# Patient Record
Sex: MALE | Race: WHITE | NOT HISPANIC OR LATINO | Employment: FULL TIME | URBAN - METROPOLITAN AREA
[De-identification: names, ages, dates, MRNs, and addresses within clinical notes are randomized per-mention and may not be internally consistent; named-entity substitution may affect disease eponyms.]

---

## 2017-01-09 ENCOUNTER — OFFICE VISIT (OUTPATIENT)
Dept: SPORTS MEDICINE | Facility: CLINIC | Age: 52
End: 2017-01-09

## 2017-01-09 VITALS
SYSTOLIC BLOOD PRESSURE: 109 MMHG | WEIGHT: 214 LBS | HEIGHT: 72 IN | OXYGEN SATURATION: 98 % | HEART RATE: 50 BPM | DIASTOLIC BLOOD PRESSURE: 73 MMHG | BODY MASS INDEX: 28.99 KG/M2 | TEMPERATURE: 97.7 F

## 2017-01-09 DIAGNOSIS — M99.02 THORACIC REGION SOMATIC DYSFUNCTION: ICD-10-CM

## 2017-01-09 DIAGNOSIS — M99.01 CERVICAL SOMATIC DYSFUNCTION: ICD-10-CM

## 2017-01-09 DIAGNOSIS — M54.2 ACUTE NECK PAIN: Primary | ICD-10-CM

## 2017-01-09 PROCEDURE — 98925 OSTEOPATH MANJ 1-2 REGIONS: CPT | Performed by: FAMILY MEDICINE

## 2017-01-09 PROCEDURE — 99204 OFFICE O/P NEW MOD 45 MIN: CPT | Performed by: FAMILY MEDICINE

## 2017-01-09 RX ORDER — TIZANIDINE HYDROCHLORIDE 4 MG/1
4 CAPSULE, GELATIN COATED ORAL 3 TIMES DAILY PRN
Qty: 30 CAPSULE | Refills: 0 | Status: SHIPPED | OUTPATIENT
Start: 2017-01-09 | End: 2021-09-02

## 2017-01-09 NOTE — PROGRESS NOTES
"Virgilio is a 51 y.o. year old male    Chief Complaint   Patient presents with   • Cervical Spine - Pain       History of Present Illness  Neck pain: x 6 wks. No specific injury. Predominately right sided. Worsened overnight. Also has difficulty getting in and out of car. Tried heating pad which helped. Advil gives some relief.  Requests OMT.    I have reviewed the patient's medical history in detail and updated the computerized patient record.    Review of Systems   Constitutional: Negative for chills and fever.   Respiratory: Negative for chest tightness, shortness of breath and wheezing.    Cardiovascular: Negative for chest pain.   Musculoskeletal: Positive for neck pain. Negative for back pain, gait problem and neck stiffness.   Neurological: Negative for weakness, numbness and headaches.   All other systems reviewed and are negative.      Visit Vitals   • /73 (BP Location: Right arm, Patient Position: Sitting, Cuff Size: Adult)   • Pulse 50   • Temp 97.7 °F (36.5 °C) (Oral)   • Ht 72\" (182.9 cm)   • Wt 214 lb (97.1 kg)   • SpO2 98%   • BMI 29.02 kg/m2        Physical Exam    Vital signs reviewed.   General: No acute distress.  Eyes: conjunctiva clear; pupils equally round and reactive  ENT: external ears and nose atraumatic; oropharynx clear  CV: no peripheral edema, 2+ distal pulses  Resp: normal respiratory effort, no use of accessory muscles  Skin: no rashes or wounds; normal turgor  Psych: mood and affect appropriate; recent and remote memory intact  Neuro: sensation to light touch intact; 2+ DTRs C5, C6 bilateral    MSK Exam:  C-spine: Limited range of motion with right rotation, right side bending due to pain; negative Spurling maneuver bilateral; 5/5  strength, arm abduction    Osteopathic exam:  Tissue texture changes cervical, thoracic spine  Restricted OA diaphragm  Restricted thoracic outlet  Right trapezius counterstrain tender point  Right rhomboid counterstrain tender points  T3-4 neutral, " side bent left rotated right    Procedure: osteopathic manipulative treatment    Risks, benefits and alternatives discussed with patient and verbal consent obtained. Area of maximal tenderness palpated and documented above. Treatment included: counterstrain, HVLA and myofascial release. Patient tolerated treatment well and demonstrated decreased tenderness, improved range of motion. Post treatment instructions given verbally.      Diagnoses and all orders for this visit:    Acute neck pain  -     TiZANidine (ZANAFLEX) 4 MG capsule; Take 1 capsule by mouth 3 (Three) Times a Day As Needed for muscle spasms.    Cervical somatic dysfunction    Thoracic region somatic dysfunction      Tolerated osteopathic manipulative treatment well as documented above.  Postprocedural instructions given.  Also recommend that he start home exercise program for range of motion and strengthening of the neck.  Can try tizanidine on as-needed basis.  I'll follow-up in one week.

## 2017-01-09 NOTE — MR AVS SNAPSHOT
Virgilio Sheets   1/9/2017 11:40 AM   Office Visit    Dept Phone:  [phone]   Encounter #:  [id number]    Provider:  Jeremy Dobbs Jr., DO   Department:  [facility]                Your Full Care Plan              Today's Medication Changes          These changes are accurate as of: 1/9/17 12:14 PM.  If you have any questions, ask your nurse or doctor.               New Medication(s)Ordered:     TiZANidine 4 MG capsule   Commonly known as:  ZANAFLEX   Take 1 capsule by mouth 3 (Three) Times a Day As Needed for muscle spasms.   Started by:  Jeremy Dobbs Jr., DO            Where to Get Your Medications      These medications were sent to [address] [phone] [phone] [address] [address]     Phone:  [phone]     TiZANidine 4 MG capsule                  Your Updated Medication List          This list is accurate as of: 1/9/17 12:14 PM.  Always use your most recent med list.                allopurinol 300 MG tablet   Commonly known as:  ZYLOPRIM   Take 1 tablet by mouth daily.       amLODIPine-benazepril 10-40 MG per capsule   Commonly known as:  LOTREL       azithromycin 250 MG tablet   Commonly known as:  ZITHROMAX Z-ROSALBA   Take 2 tablets the first day, then 1 tablet daily for 4 days.       Calcitriol 3 MCG/GM cream   Apply 1 application topically every night.       ciclopirox 8 % solution   Commonly known as:  PENLAC   Apply  topically every night.       ciprofloxacin 500 MG tablet   Commonly known as:  CIPRO   Take 1 tablet by mouth 2 (two) times a day.       clobetasol 0.05 % ointment   Commonly known as:  TEMOVATE   Apply  topically 2 (two) times a day.       guaifenesin-codeine 100-10 MG/5ML liquid   Commonly known as:  GUAIFENESIN AC   1-2 teaspoons at bedtime as needed for cough       hydrochlorothiazide 25 MG tablet    "  Commonly known as:  HYDRODIURIL   Take 1 tablet by mouth daily.       levothyroxine 125 MCG tablet   Commonly known as:  SYNTHROID, LEVOTHROID   Take 1 tablet by mouth daily.       rosuvastatin 20 MG tablet   Commonly known as:  CRESTOR       TiZANidine 4 MG capsule   Commonly known as:  ZANAFLEX   Take 1 capsule by mouth 3 (Three) Times a Day As Needed for muscle spasms.       Vitamin D 2000 UNITS tablet   Take 2,000 Units by mouth every other day.               You Were Diagnosed With        Codes Comments    Acute neck pain    -  Primary ICD-10-CM: M54.2  ICD-9-CM: 723.1     Cervical somatic dysfunction     ICD-10-CM: M99.01  ICD-9-CM: 739.1     Thoracic region somatic dysfunction     ICD-10-CM: M99.02  ICD-9-CM: 739.2       Instructions     None    Patient Instructions History      Upcoming Appointments     Visit Type Date Time Department    NEW PT - SPORTS MEDICINE 1/9/2017 11:40 AM [facility] SPORTS MED [facility]      [employer] Signup     Our records indicate that you have an active [employer] account.    You can view your After Visit Summary by going to [employer] and logging in with your [employer] username and password.  If you don't have a [employer] username and password but a parent or guardian has access to your record, the parent or guardian should login with their own [employer] username and password and access your record to view the After Visit Summary.    If you have questions, you can email [email or web address] or call [phone] to talk to our [employer] staff.  Remember, [employer] is NOT to be used for urgent needs.  For medical emergencies, dial 911.               Other Info from Your Visit           Allergies     No Known Allergies      Reason for Visit     Cervical Spine - Pain           Vital Signs     Blood Pressure Pulse Temperature Height Weight Oxygen Saturation    109/73 (BP Location: Right arm, Patient Position: Sitting, Cuff Size: Adult) 50 97.7 °F (36.5 °C) (Oral) 72\" " (182.9 cm) 214 lb (97.1 kg) 98%    Body Mass Index Smoking Status                29.02 kg/m2 Never Smoker          Problems and Diagnoses Noted     Neck pain    -  Primary    Cervical somatic dysfunction        Thoracic region somatic dysfunction

## 2017-05-22 ENCOUNTER — TELEPHONE (OUTPATIENT)
Dept: ORTHOPEDIC SURGERY | Facility: CLINIC | Age: 52
End: 2017-05-22

## 2017-05-26 ENCOUNTER — OFFICE VISIT (OUTPATIENT)
Dept: ORTHOPEDIC SURGERY | Facility: CLINIC | Age: 52
End: 2017-05-26

## 2017-05-26 VITALS — TEMPERATURE: 98 F | WEIGHT: 214 LBS | HEIGHT: 72 IN | BODY MASS INDEX: 28.99 KG/M2

## 2017-05-26 DIAGNOSIS — IMO0002 BURSITIS/TENDONITIS, SHOULDER: ICD-10-CM

## 2017-05-26 DIAGNOSIS — M25.512 ACUTE PAIN OF LEFT SHOULDER: Primary | ICD-10-CM

## 2017-05-26 DIAGNOSIS — M75.02 ADHESIVE CAPSULITIS OF LEFT SHOULDER: ICD-10-CM

## 2017-05-26 PROCEDURE — 73030 X-RAY EXAM OF SHOULDER: CPT | Performed by: ORTHOPAEDIC SURGERY

## 2017-05-26 PROCEDURE — 20610 DRAIN/INJ JOINT/BURSA W/O US: CPT | Performed by: ORTHOPAEDIC SURGERY

## 2017-05-26 PROCEDURE — 99203 OFFICE O/P NEW LOW 30 MIN: CPT | Performed by: ORTHOPAEDIC SURGERY

## 2017-05-26 RX ORDER — BUPIVACAINE HYDROCHLORIDE 2.5 MG/ML
4 INJECTION, SOLUTION INFILTRATION; PERINEURAL
Status: COMPLETED | OUTPATIENT
Start: 2017-05-26 | End: 2017-05-26

## 2017-05-26 RX ORDER — MELOXICAM 15 MG/1
TABLET ORAL
Qty: 30 TABLET | Refills: 3 | Status: SHIPPED | OUTPATIENT
Start: 2017-05-26 | End: 2021-09-02

## 2017-05-26 RX ORDER — METHYLPREDNISOLONE ACETATE 80 MG/ML
80 INJECTION, SUSPENSION INTRA-ARTICULAR; INTRALESIONAL; INTRAMUSCULAR; SOFT TISSUE
Status: COMPLETED | OUTPATIENT
Start: 2017-05-26 | End: 2017-05-26

## 2017-05-26 RX ADMIN — BUPIVACAINE HYDROCHLORIDE 4 ML: 2.5 INJECTION, SOLUTION INFILTRATION; PERINEURAL at 09:06

## 2017-05-26 RX ADMIN — METHYLPREDNISOLONE ACETATE 80 MG: 80 INJECTION, SUSPENSION INTRA-ARTICULAR; INTRALESIONAL; INTRAMUSCULAR; SOFT TISSUE at 09:06

## 2019-02-22 DIAGNOSIS — M54.50 LUMBAR SPINE PAIN: Primary | ICD-10-CM

## 2019-03-26 ENCOUNTER — CONSULT (OUTPATIENT)
Dept: ORTHOPEDIC SURGERY | Facility: CLINIC | Age: 54
End: 2019-03-26

## 2019-03-26 VITALS — BODY MASS INDEX: 28.44 KG/M2 | WEIGHT: 210 LBS | HEIGHT: 72 IN | TEMPERATURE: 98 F

## 2019-03-26 DIAGNOSIS — M54.31 BILATERAL SCIATICA: ICD-10-CM

## 2019-03-26 DIAGNOSIS — M54.50 SPINE PAIN, LUMBAR: Primary | ICD-10-CM

## 2019-03-26 DIAGNOSIS — M54.32 BILATERAL SCIATICA: ICD-10-CM

## 2019-03-26 PROCEDURE — 72100 X-RAY EXAM L-S SPINE 2/3 VWS: CPT | Performed by: ORTHOPAEDIC SURGERY

## 2019-03-26 PROCEDURE — 99214 OFFICE O/P EST MOD 30 MIN: CPT | Performed by: ORTHOPAEDIC SURGERY

## 2019-03-26 RX ORDER — METHYLPREDNISOLONE 4 MG/1
TABLET ORAL
Qty: 21 TABLET | Refills: 0 | Status: SHIPPED | OUTPATIENT
Start: 2019-03-26 | End: 2019-05-03

## 2019-03-26 NOTE — PROGRESS NOTES
New patient or new problem visit    Chief Complaint   Patient presents with   • Lumbar Spine - Pain       HPI: He complains of right forefoot numbness in the third intermetatarsal carpal space since August of last year.  Is more numbness than pain injection in the area by his podiatrist did not help he also notes back pain which radiates to the left buttock and left lower extremity.  The leg pain exceeds the back pain.  He states he would not be here if it were not for the leg pain.  Physical therapy has not helped.    PFSH: See chart- reviewed    Review of Systems   Constitutional: Negative for chills, fever and unexpected weight change.   HENT: Negative for trouble swallowing and voice change.    Eyes: Negative for visual disturbance.   Respiratory: Negative for cough and shortness of breath.    Cardiovascular: Negative for chest pain and leg swelling.   Gastrointestinal: Negative for abdominal pain, nausea and vomiting.   Endocrine: Negative for cold intolerance and heat intolerance.   Genitourinary: Negative for difficulty urinating, dysuria, frequency and urgency.   Musculoskeletal: Positive for back pain.   Skin: Negative for rash and wound.   Allergic/Immunologic: Negative for immunocompromised state.   Neurological: Positive for numbness. Negative for weakness and headaches.   Hematological: Does not bruise/bleed easily.   Psychiatric/Behavioral: Negative for dysphoric mood. The patient is not nervous/anxious.        PE: Constitutional: Vital signs above-noted.  Awake, alert and oriented    Psychiatric: Affect and insight do not appear grossly disturbed.    Pulmonary: Breathing is unlabored, color is good.    Skin: Warm, dry and normal turgor    Cardiac: Pedal pulses intact.  No edema.    Eyesight and hearing appear adequate for examination purposes      Musculoskeletal:  There is mild tenderness to percussion and palpation of the spine. Motion appears undisturbed.  Posture is unremarkable to coronal and  sagittal inspection.    The skin about the area is intact.  There is no palpable or visible deformity.  There is no local spasm.       Neurologic:   Reflexes are 2+ and symmetrical in the patellae and achilles.   Motor function is undisturbed in quadriceps, EHL, and gastrocnemius   sensation appears symmetrically intact to light touch.  In the bilateral lower extremities there is no evidence of atrophy.   Clonus is absent..  Gait appears undisturbed.  SLR test negative    Palpation of the right foot in particular the third ray shows no evidence of palpable masses or tenderness    MEDICAL DECISION MAKING    XRAY: Plain film x-ray of the lumbar spine shows mild degenerative change and some loss of lordosis.  MRI report from 2015 suggested lumbosacral disc degeneration but no neurologic compression.    Other: n/a    Impression: Lumbar spinal stenosis, possibly separate issue by way of Live's neuroma versus radicular numbness    Plan: For now Medrol Dosepak.  If he is no better he will call and we will schedule MRI scan perhaps with an eye toward epidural injection.  I think he can go ahead and play golf in either event  Answers for HPI/ROS submitted by the patient on 3/21/2019   Back pain  Chronicity: recurrent  Onset: more than 1 month ago  Frequency: constantly  Progression since onset: waxing and waning  Pain location: sacro-iliac  Pain quality: aching, shooting, stabbing  Radiates to: left thigh, right foot  Pain - numeric: 6/10  Pain is: worse during the day  Aggravated by: bending, position, sitting, stress, twisting  Stiffness is present: all day  bladder incontinence: No  bowel incontinence: No  leg pain: Yes  paresis: No  paresthesias: Yes  pelvic pain: No  perianal numbness: No  tingling: Yes  weight loss: No  Risk factors: poor posture

## 2019-03-29 ENCOUNTER — TELEPHONE (OUTPATIENT)
Dept: ORTHOPEDIC SURGERY | Facility: CLINIC | Age: 54
End: 2019-03-29

## 2019-03-29 NOTE — TELEPHONE ENCOUNTER
Regarding: Non-Urgent Medical Question  Contact: [phone]  ----- Message from Mychart, Generic sent at 3/29MY CHART MESSAGE:    Good morning, Dr. Portillo.    I am in day 4 of the dose pack and have some relief.  Not complete yet on the left side.  Also, my right foot still feels the same.    I went ahead and scheduled a follow up visit with you for May 3 since your schedule is so full.  If I get complete relief before then, I will cancel it.  If not, I'd like to get the injection we discussed on that day.    Question: does it make sense to go ahead and get the MRI done now so that we know what we are dealing with?  Or do you want to give it another 2-3 weeks or so?  I just want to make sure that if we have to get an MRI, it is done in plenty of time for the May 3 follow-up.     Thanks,    Virgilio Sheets  [phone] (cell)

## 2019-03-29 NOTE — TELEPHONE ENCOUNTER
Explained to him about the epidurals-it would be unlikely we would be able to get him in on days notice.  I suggest he call back next week and if he is doing better we will hold off and if he is doing worse we will go ahead and schedule the epidural injection for early May and will get the MRI meantime.

## 2019-03-29 NOTE — TELEPHONE ENCOUNTER
Call returned to the patient.  I was unable to reach him but I did leave a message on his answering machine to go ahead and complete the Dosepak and to contact the office next week and let TOMASA DAWSON know how he is doing.  If his symptoms have improved we can hold off on the MRI and epidural however if he continues to have problems we can go ahead and schedule his MRI and put an epidural at that time per TOMASA DAWSON

## 2019-04-01 ENCOUNTER — TELEPHONE (OUTPATIENT)
Dept: ORTHOPEDIC SURGERY | Facility: CLINIC | Age: 54
End: 2019-04-01

## 2019-04-01 DIAGNOSIS — M54.32 BILATERAL SCIATICA: ICD-10-CM

## 2019-04-01 DIAGNOSIS — M54.31 BILATERAL SCIATICA: ICD-10-CM

## 2019-04-01 DIAGNOSIS — M54.50 SPINE PAIN, LUMBAR: Primary | ICD-10-CM

## 2019-04-01 NOTE — TELEPHONE ENCOUNTER
Regarding: Non-Urgent Medical Question  Contact: [phone]  ----- Message from Mychart, Generic sent at 4/1/2019  3:56 PM EDT -----  MY CHART MESSAGE:  Kip Portillo,    I completed my dose pack yesterday.  My back feels about the same, although the pain is not shooting down my left leg as much.  It is more concentrated in my left lower back region.  The numbness in my right foot feels the same.    I'd like to get that MRI scheduled.  Can you send it over to [facility] [place] [facility]?    Thanks,    Virgilio Sheets  [phone]

## 2019-04-02 ENCOUNTER — TELEPHONE (OUTPATIENT)
Dept: ORTHOPEDIC SURGERY | Facility: CLINIC | Age: 54
End: 2019-04-02

## 2019-04-03 ENCOUNTER — TELEPHONE (OUTPATIENT)
Dept: ORTHOPEDIC SURGERY | Facility: CLINIC | Age: 54
End: 2019-04-03

## 2019-04-04 DIAGNOSIS — M54.50 SPINE PAIN, LUMBAR: ICD-10-CM

## 2019-04-04 DIAGNOSIS — M54.31 BILATERAL SCIATICA: ICD-10-CM

## 2019-04-04 DIAGNOSIS — M54.32 BILATERAL SCIATICA: ICD-10-CM

## 2019-04-05 DIAGNOSIS — M54.32 BILATERAL SCIATICA: Primary | ICD-10-CM

## 2019-04-05 DIAGNOSIS — M54.50 SPINE PAIN, LUMBAR: ICD-10-CM

## 2019-04-05 DIAGNOSIS — M54.31 BILATERAL SCIATICA: Primary | ICD-10-CM

## 2019-04-08 ENCOUNTER — TELEPHONE (OUTPATIENT)
Dept: ORTHOPEDIC SURGERY | Facility: CLINIC | Age: 54
End: 2019-04-08

## 2019-04-08 DIAGNOSIS — M54.50 LUMBAR SPINE PAIN: Primary | ICD-10-CM

## 2019-04-08 NOTE — TELEPHONE ENCOUNTER
Regarding: Non-Urgent Medical Question  Contact: [phone]  ----- Message from Mychart, Generic sent at 4/8/2019  8:30 AM EDT -----  MY CHART MESSAGE:    Good morning, again.  A good friend of mine has a brother that is a very good interventional pain specialist in [place] -- Dr. Benjamin Young.  Please see his attached bio.   I'd like to use his services for the epidural, and we are in contact with each other.  Would you please fax him the order and all notes that would support the prior authorization process?  I greatly appreciate your prompt attention to this.  You can call me if you have any questions.  Dr. Young' information is below:    Fax: [phone]  Phone: [phone]    Thank you,    Virgilio Sheets

## 2019-04-09 RX ORDER — HYDROCODONE BITARTRATE AND ACETAMINOPHEN 5; 325 MG/1; MG/1
1 TABLET ORAL EVERY 8 HOURS PRN
Qty: 30 TABLET | Refills: 0 | Status: SHIPPED | OUTPATIENT
Start: 2019-04-09 | End: 2021-09-02

## 2019-04-09 NOTE — TELEPHONE ENCOUNTER
Regarding: Prescription Question  Contact: [phone]  ----- Message from Mychart, Generic sent at 4/9/2019  8:46 AM EDT -----  MY CHART MESSAGE:    Good morning, I have had 2 sleepless nights due to shooting pain in my leg.  Can you prescribe something for the nights?  I am currently managing my pain with ibuprofen (3 or 4 tablets three or four times a day).  I also manage the pain during the day by not sitting down too much, but when I sit or lie down, the pain shoots down my leg.  It has been tough to sleep.    Thanks,    Virgilio

## 2019-04-17 ENCOUNTER — TELEPHONE (OUTPATIENT)
Dept: ORTHOPEDIC SURGERY | Facility: CLINIC | Age: 54
End: 2019-04-17

## 2019-05-03 ENCOUNTER — OFFICE VISIT (OUTPATIENT)
Dept: ORTHOPEDIC SURGERY | Facility: CLINIC | Age: 54
End: 2019-05-03

## 2019-05-03 VITALS — TEMPERATURE: 97.5 F | BODY MASS INDEX: 28.31 KG/M2 | WEIGHT: 209 LBS | HEIGHT: 72 IN

## 2019-05-03 DIAGNOSIS — M54.32 SCIATICA OF LEFT SIDE: Primary | ICD-10-CM

## 2019-05-03 PROCEDURE — 99213 OFFICE O/P EST LOW 20 MIN: CPT | Performed by: ORTHOPAEDIC SURGERY

## 2019-05-03 NOTE — PROGRESS NOTES
"He is having some numbness in the second and third toes of both feet and he states the right side is been like that a long time the left-sided symptoms are new and presumably due to the herniated disc.  He does have a left L5-S1 disc extrusion and he is now undergone an epidural injection and a \"sciatic nerve block \"10 days later the nerve block helped the epidural did not he has good strength on exam sensation and reflexes are grossly intact.  He will continue exercises he can try golf I will see him back in a month he is going to get one more shot.  I think he is a good candidate for removal of the disc I would tend to leave the spinal stenosis of 4 5 alone and just take out the disc at 5 1 if we were to operate.  "

## 2020-12-09 ENCOUNTER — TRANSCRIBE ORDERS (OUTPATIENT)
Dept: LAB | Facility: HOSPITAL | Age: 55
End: 2020-12-09

## 2020-12-09 ENCOUNTER — LAB (OUTPATIENT)
Dept: LAB | Facility: HOSPITAL | Age: 55
End: 2020-12-09

## 2020-12-09 DIAGNOSIS — K52.9 CHRONIC DIARRHEA: ICD-10-CM

## 2020-12-09 DIAGNOSIS — K52.9 CHRONIC DIARRHEA: Primary | ICD-10-CM

## 2020-12-09 PROCEDURE — 83993 ASSAY FOR CALPROTECTIN FECAL: CPT

## 2020-12-10 LAB — CALPROTECTIN STL-MCNT: <16 UG/G (ref 0–120)

## 2021-03-30 ENCOUNTER — BULK ORDERING (OUTPATIENT)
Dept: CASE MANAGEMENT | Facility: OTHER | Age: 56
End: 2021-03-30

## 2021-03-30 DIAGNOSIS — Z23 IMMUNIZATION DUE: ICD-10-CM

## 2021-09-02 ENCOUNTER — OFFICE VISIT (OUTPATIENT)
Dept: ORTHOPEDIC SURGERY | Facility: CLINIC | Age: 56
End: 2021-09-02

## 2021-09-02 VITALS — TEMPERATURE: 97.5 F | BODY MASS INDEX: 28.04 KG/M2 | HEIGHT: 72 IN | WEIGHT: 207 LBS

## 2021-09-02 DIAGNOSIS — M25.521 RIGHT ELBOW PAIN: ICD-10-CM

## 2021-09-02 PROCEDURE — 20550 NJX 1 TENDON SHEATH/LIGAMENT: CPT | Performed by: ORTHOPAEDIC SURGERY

## 2021-09-02 PROCEDURE — 73070 X-RAY EXAM OF ELBOW: CPT | Performed by: ORTHOPAEDIC SURGERY

## 2021-09-02 PROCEDURE — 99213 OFFICE O/P EST LOW 20 MIN: CPT | Performed by: ORTHOPAEDIC SURGERY

## 2021-09-02 RX ORDER — DICLOFENAC SODIUM 20 MG/G
1 SOLUTION TOPICAL 2 TIMES DAILY
Qty: 112 G | Refills: 12 | Status: SHIPPED | OUTPATIENT
Start: 2021-09-02

## 2021-09-02 RX ORDER — LEVOTHYROXINE SODIUM 175 MCG
TABLET ORAL
COMMUNITY
Start: 2021-07-05

## 2021-09-02 RX ORDER — METHYLPREDNISOLONE ACETATE 80 MG/ML
80 INJECTION, SUSPENSION INTRA-ARTICULAR; INTRALESIONAL; INTRAMUSCULAR; SOFT TISSUE
Status: COMPLETED | OUTPATIENT
Start: 2021-09-02 | End: 2021-09-02

## 2021-09-02 RX ADMIN — METHYLPREDNISOLONE ACETATE 80 MG: 80 INJECTION, SUSPENSION INTRA-ARTICULAR; INTRALESIONAL; INTRAMUSCULAR; SOFT TISSUE at 08:17

## 2022-01-24 ENCOUNTER — OFFICE VISIT (OUTPATIENT)
Dept: ORTHOPEDIC SURGERY | Facility: CLINIC | Age: 57
End: 2022-01-24

## 2022-01-24 VITALS — HEIGHT: 72 IN | BODY MASS INDEX: 26.41 KG/M2 | TEMPERATURE: 96.8 F | WEIGHT: 195 LBS

## 2022-01-24 DIAGNOSIS — M77.11 LATERAL EPICONDYLITIS OF RIGHT ELBOW: Primary | ICD-10-CM

## 2022-01-24 PROCEDURE — 20551 NJX 1 TENDON ORIGIN/INSJ: CPT | Performed by: ORTHOPAEDIC SURGERY

## 2022-01-24 PROCEDURE — 99213 OFFICE O/P EST LOW 20 MIN: CPT | Performed by: ORTHOPAEDIC SURGERY

## 2022-01-24 RX ADMIN — TRIAMCINOLONE ACETONIDE 40 MG: 40 INJECTION, SUSPENSION INTRA-ARTICULAR; INTRAMUSCULAR at 16:41

## 2022-01-24 NOTE — PROGRESS NOTES
Patient: Virgilio Sheets  YOB: 1965  Date of Service: 1/24/2022    Chief Complaints: Right elbow pain    Subjective:    History of Present Illness: Pt is seen in the office today with complaints of right elbow pain I last saw him in September he was felt to have lateral condyle-itis he states he was putting up Joan lights and feels like that is what irritated it again he got 100% relief the last injection he does have a tennis elbow strap but he would like another one as it is losing its Velcro.          Allergies: No Known Allergies    Medications:   Home Medications:  Current Outpatient Medications on File Prior to Visit   Medication Sig   • allopurinol (ZYLOPRIM) 300 MG tablet Take 1 tablet by mouth daily.   • amLODIPine-benazepril (LOTREL) 10-40 MG per capsule    • Aspirin Buf,CaCarb-MgCarb-MgO, 81 MG tablet Take 81 mg by mouth Daily.   • Cholecalciferol (VITAMIN D) 2000 UNITS tablet Take 2,000 Units by mouth every other day.   • clobetasol (TEMOVATE) 0.05 % ointment Apply  topically 2 (two) times a day.   • Diclofenac Sodium 2 % solution Apply 1 Pump topically 2 (Two) Times a Day.   • hydrochlorothiazide (HYDRODIURIL) 25 MG tablet Take 1 tablet by mouth daily.   • levothyroxine (SYNTHROID, LEVOTHROID) 125 MCG tablet Take 1 tablet by mouth daily. (Patient taking differently: Take 175 mcg by mouth Daily.)   • rosuvastatin (CRESTOR) 20 MG tablet    • Synthroid 175 MCG tablet      No current facility-administered medications on file prior to visit.     Current Medications:  Scheduled Meds:  Continuous Infusions:No current facility-administered medications for this visit.    PRN Meds:.    I have reviewed the patient's medical history in detail and updated the computerized patient record.  Review and summarization of old records include:    Past Medical History:   Diagnosis Date   • Bunion    • Disease of thyroid gland    • Gout    • Hyperlipidemia    • Hypertension         Past Surgical History:    Procedure Laterality Date   • COLONOSCOPY  11/18/2015   • FOOT SURGERY Right     TREATMENT   • HERNIA REPAIR     • TONSILLECTOMY          Social History     Occupational History   • Not on file   Tobacco Use   • Smoking status: Never Smoker   • Smokeless tobacco: Never Used   Substance and Sexual Activity   • Alcohol use: Yes     Comment: OCCASIONALLY   • Drug use: No   • Sexual activity: Yes     Partners: Female     Birth control/protection: None      Social History     Social History Narrative   • Not on file        Family History   Problem Relation Age of Onset   • Hypertension Mother    • Hyperlipidemia Father    • Hypertension Father    • Prostate cancer Father    • Heart attack Brother        ROS: 14 point review of systems was performed and was negative except for documented findings in HPI and today's encounter.     Allergies: No Known Allergies  Constitutional:  Denies fever, shaking or chills   Eyes:  Denies change in visual acuity   HENT:  Denies nasal congestion or sore throat   Respiratory:  Denies cough or shortness of breath   Cardiovascular:  Denies chest pain or severe LE edema   GI:  Denies abdominal pain, nausea, vomiting, bloody stools or diarrhea   Musculoskeletal:  Numbness, tingling, or loss of motor function only as noted above in history of present illness.  : Denies painful urination or hematuria  Integument:  Denies rash, lesion or ulceration   Neurologic:  Denies headache or focal weakness  Endocrine:  Denies lymphadenopathy  Psych:  Denies confusion or change in mental status   Hem:  Denies active bleeding      Physical Exam: 56 y.o. male  Wt Readings from Last 3 Encounters:   09/02/21 93.9 kg (207 lb)   05/03/19 94.8 kg (209 lb)   03/26/19 95.3 kg (210 lb)       There is no height or weight on file to calculate BMI.  No height and weight on file for this encounter.  There were no vitals filed for this visit.  Vital signs reviewed.   General Appearance:    Alert, cooperative, in no  acute distress                    Ortho exam    Physical exam of the right elbow reveals no effusion no redness.  The patient has full range of motion.  They have tenderness over the lateral condyle.  There pain with resisted wrist extension no pain with passive wrist flexion.  They have a negative Tinel's.  Have no overlying skin changes no lymphedema no lymphadenopathy.  Good distal pulses         .time    Assessment: Right lateral epicondylitis    Plan: Plan we had a long discussion regarding the pathology we also talked about options including another steroid injection we talked about platelet injection under ultrasound we talked about the thoughts of platelets mechanism of action studies that were done we also talked about other means of testing such as an MRI.  I think after fairly lengthy discussion at least 15 min we did decide that a steroid injection was reasonable however symptoms return I would consider a PRP and probably do it under ultrasound we still get a good look at the tendon but do PRP injection.  I referred him to an article the compares steroid versus PRP will also give him some more Pennsaid cream and a new tennis elbow strap follow up as indicated.  Ice, elevate, and rest as needed.  Discussed conservative measures of pain control including ice, bracing. Angle Alba M.D.    Medium Joint Arthrocentesis: R elbow  Date/Time: 1/24/2022 4:41 PM  Consent given by: patient  Site marked: site marked  Timeout: Immediately prior to procedure a time out was called to verify the correct patient, procedure, equipment, support staff and site/side marked as required   Supporting Documentation  Indications: pain and diagnostic evaluation   Procedure Details  Location: elbow - R elbow (lateral Epicondyle)  Preparation: Patient was prepped and draped in the usual sterile fashion  Needle size: 25 G  Approach: Into the area of the common flexor tendon.  Medications administered: 40 mg triamcinolone acetonide  40 MG/ML; 3 mL lidocaine (cardiac)

## 2022-01-25 RX ORDER — TRIAMCINOLONE ACETONIDE 40 MG/ML
40 INJECTION, SUSPENSION INTRA-ARTICULAR; INTRAMUSCULAR
Status: COMPLETED | OUTPATIENT
Start: 2022-01-24 | End: 2022-01-24

## 2022-03-28 ENCOUNTER — TELEPHONE (OUTPATIENT)
Dept: ORTHOPEDIC SURGERY | Facility: CLINIC | Age: 57
End: 2022-03-28

## 2022-05-25 ENCOUNTER — TELEPHONE (OUTPATIENT)
Dept: ORTHOPEDIC SURGERY | Facility: CLINIC | Age: 57
End: 2022-05-25

## 2022-05-25 NOTE — TELEPHONE ENCOUNTER
Caller: Virgilio Sheets    Relationship to patient: Self    Best call back number: [phone]    Chief complaint: PRP INJECTIONS    Type of visit: INJECTION/ PROCEDURE    Requested date: ASAP       Additional notes: KNOWS IT WILL BE OUT OF POCKET- PLEASE CALL PATIENT TO SCHEDULE - WT'ED TO OFFICE AND NO ONE ANSWERED AND WAS NOT SENT TO - PATIENT HAD TO CALL BACK TO SEND MESSAGE

## 2022-06-21 ENCOUNTER — CLINICAL SUPPORT (OUTPATIENT)
Dept: ORTHOPEDIC SURGERY | Facility: CLINIC | Age: 57
End: 2022-06-21

## 2022-06-21 VITALS — BODY MASS INDEX: 27.39 KG/M2 | HEIGHT: 72 IN | WEIGHT: 202.2 LBS | TEMPERATURE: 97.8 F

## 2022-06-21 DIAGNOSIS — M77.11 LATERAL EPICONDYLITIS OF RIGHT ELBOW: Primary | ICD-10-CM

## 2022-06-21 PROCEDURE — PRPACP: Performed by: ORTHOPAEDIC SURGERY

## 2022-06-21 NOTE — PROGRESS NOTES
Patient: Virgilio Sheets  YOB: 1965  Date of Service: 6/21/2022    Chief Complaints: Right elbow pain    Subjective:    History of Present Illness: Pt is seen in the office today with complaints of right elbow pain he got temporary relief from a cortisone injection he like to try platelet injection understands it still experimental and cost is $400.          Allergies: No Known Allergies    Medications:   Home Medications:  Current Outpatient Medications on File Prior to Visit   Medication Sig   • allopurinol (ZYLOPRIM) 300 MG tablet Take 1 tablet by mouth daily.   • amLODIPine-benazepril (LOTREL) 10-40 MG per capsule    • Aspirin Buf,CaCarb-MgCarb-MgO, 81 MG tablet Take 81 mg by mouth Daily.   • Cholecalciferol (VITAMIN D) 2000 UNITS tablet Take 2,000 Units by mouth every other day.   • clobetasol (TEMOVATE) 0.05 % ointment Apply  topically 2 (two) times a day.   • Diclofenac Sodium 2 % solution Apply 1 Pump topically 2 (Two) Times a Day.   • hydrochlorothiazide (HYDRODIURIL) 25 MG tablet Take 1 tablet by mouth daily.   • levothyroxine (SYNTHROID, LEVOTHROID) 125 MCG tablet Take 1 tablet by mouth daily. (Patient taking differently: Take 175 mcg by mouth Daily.)   • rosuvastatin (CRESTOR) 20 MG tablet    • Synthroid 175 MCG tablet      No current facility-administered medications on file prior to visit.     Current Medications:  Scheduled Meds:  Continuous Infusions:No current facility-administered medications for this visit.    PRN Meds:.    I have reviewed the patient's medical history in detail and updated the computerized patient record.  Review and summarization of old records include:    Past Medical History:   Diagnosis Date   • Bunion    • Disease of thyroid gland    • Gout    • Hyperlipidemia    • Hypertension         Past Surgical History:   Procedure Laterality Date   • COLONOSCOPY  11/18/2015   • FOOT SURGERY Right     TREATMENT   • HERNIA REPAIR     • TONSILLECTOMY          Social History      Occupational History   • Not on file   Tobacco Use   • Smoking status: Never Smoker   • Smokeless tobacco: Never Used   Vaping Use   • Vaping Use: Never used   Substance and Sexual Activity   • Alcohol use: Yes     Comment: OCCASIONALLY   • Drug use: No   • Sexual activity: Yes     Partners: Female     Birth control/protection: None      Social History     Social History Narrative   • Not on file        Family History   Problem Relation Age of Onset   • Hypertension Mother    • Hyperlipidemia Father    • Hypertension Father    • Prostate cancer Father    • Heart attack Brother        ROS: 14 point review of systems was performed and was negative except for documented findings in HPI and today's encounter.     Allergies: No Known Allergies  Constitutional:  Denies fever, shaking or chills   Eyes:  Denies change in visual acuity   HENT:  Denies nasal congestion or sore throat   Respiratory:  Denies cough or shortness of breath   Cardiovascular:  Denies chest pain or severe LE edema   GI:  Denies abdominal pain, nausea, vomiting, bloody stools or diarrhea   Musculoskeletal:  Numbness, tingling, or loss of motor function only as noted above in history of present illness.  : Denies painful urination or hematuria  Integument:  Denies rash, lesion or ulceration   Neurologic:  Denies headache or focal weakness  Endocrine:  Denies lymphadenopathy  Psych:  Denies confusion or change in mental status   Hem:  Denies active bleeding      Physical Exam: 56 y.o. male  Wt Readings from Last 3 Encounters:   01/24/22 88.5 kg (195 lb)   09/02/21 93.9 kg (207 lb)   05/03/19 94.8 kg (209 lb)       There is no height or weight on file to calculate BMI.  No height and weight on file for this encounter.  There were no vitals filed for this visit.  Vital signs reviewed.   General Appearance:    Alert, cooperative, in no acute distress                    Ortho exam      Exam is unchanged     .time    Assessment: Left lateral  epicondylitis    Plan: PRP injection Kaitlin hameed the blood was spun it down injected sterilely over the lateral condyle  Follow up as indicated.  Ice, elevate, and rest as needed.  Discussed conservative measures of pain control including ice, bracing.  Also talked about the importance of strengthening     Angle Alba M.D.      Procedures

## 2022-08-04 ENCOUNTER — TELEPHONE (OUTPATIENT)
Dept: ORTHOPEDIC SURGERY | Facility: CLINIC | Age: 57
End: 2022-08-04

## 2022-08-04 NOTE — TELEPHONE ENCOUNTER
Provider: DR. MARLA GONZALEZ  Caller: EDUAR DANIEL  Relationship to Patient: SELF  Pharmacy:   Phone Number:  [phone]  Reason for Call:  PATIENT ASKING FOR COPY OF RIGHT ELBOW ON A CD WITH X-RAY RESULTS/REPORT DONE 9/2/21. PATIENT CAN  AT [facility] OFFICE. ASKING IF POSSIBLE TO  TOMORROW, Friday 8/5/22. NEEDS FOR AN APPT. ON Monday.

## 2023-02-09 ENCOUNTER — TELEPHONE (OUTPATIENT)
Dept: ORTHOPEDIC SURGERY | Facility: CLINIC | Age: 58
End: 2023-02-09

## 2023-02-09 NOTE — TELEPHONE ENCOUNTER
Caller: Virgilio Sheets    Relationship to patient: Self    Best call back number: [phone]    Chief complaint: RT ELBOW    Type of visit: INJECTION

## 2023-02-15 NOTE — PROGRESS NOTES
Patient: Virgilio Sheets  YOB: 1965  Date of Service: 2/15/2023    Chief Complaints: Right elbow pain    Subjective:    History of Present Illness: Pt is seen in the office today with complaints of right elbow pain he has known lateral condylitis has had steroid injections in PRP injections both of which help he is getting ready go on a golf trip and called and wanted know if I do it cortisone.  He has since seen Dr. Caruso and an MRI was done which shows a partial extensor tendon tendon tear about 50%.  He has opted to continue conservative management which would be my recommendation as well        Allergies: No Known Allergies    Medications:   Home Medications:  Current Outpatient Medications on File Prior to Visit   Medication Sig   • allopurinol (ZYLOPRIM) 300 MG tablet Take 1 tablet by mouth daily.   • amLODIPine-benazepril (LOTREL) 10-40 MG per capsule    • Aspirin Buf,CaCarb-MgCarb-MgO, 81 MG tablet Take 81 mg by mouth Daily.   • Cholecalciferol (VITAMIN D) 2000 UNITS tablet Take 2,000 Units by mouth every other day.   • clobetasol (TEMOVATE) 0.05 % ointment Apply  topically 2 (two) times a day.   • Diclofenac Sodium 2 % solution Apply 1 Pump topically 2 (Two) Times a Day.   • hydrochlorothiazide (HYDRODIURIL) 25 MG tablet Take 1 tablet by mouth daily.   • levothyroxine (SYNTHROID, LEVOTHROID) 125 MCG tablet Take 1 tablet by mouth daily. (Patient taking differently: Take 175 mcg by mouth Daily.)   • rosuvastatin (CRESTOR) 20 MG tablet    • Synthroid 175 MCG tablet      No current facility-administered medications on file prior to visit.     Current Medications:  Scheduled Meds:  Continuous Infusions:No current facility-administered medications for this visit.    PRN Meds:.    I have reviewed the patient's medical history in detail and updated the computerized patient record.  Review and summarization of old records include:    Past Medical History:   Diagnosis Date   • Bunion    • Disease of  thyroid gland    • Gout    • Hyperlipidemia    • Hypertension         Past Surgical History:   Procedure Laterality Date   • COLONOSCOPY  11/18/2015   • FOOT SURGERY Right     TREATMENT   • HERNIA REPAIR     • TONSILLECTOMY          Social History     Occupational History   • Not on file   Tobacco Use   • Smoking status: Never   • Smokeless tobacco: Never   Vaping Use   • Vaping Use: Never used   Substance and Sexual Activity   • Alcohol use: Yes     Comment: OCCASIONALLY   • Drug use: No   • Sexual activity: Yes     Partners: Female     Birth control/protection: None      Social History     Social History Narrative   • Not on file        Family History   Problem Relation Age of Onset   • Hypertension Mother    • Hyperlipidemia Father    • Hypertension Father    • Prostate cancer Father    • Heart attack Brother        ROS: 14 point review of systems was performed and was negative except for documented findings in HPI and today's encounter.     Allergies: No Known Allergies  Constitutional:  Denies fever, shaking or chills   Eyes:  Denies change in visual acuity   HENT:  Denies nasal congestion or sore throat   Respiratory:  Denies cough or shortness of breath   Cardiovascular:  Denies chest pain or severe LE edema   GI:  Denies abdominal pain, nausea, vomiting, bloody stools or diarrhea   Musculoskeletal:  Numbness, tingling, or loss of motor function only as noted above in history of present illness.  : Denies painful urination or hematuria  Integument:  Denies rash, lesion or ulceration   Neurologic:  Denies headache or focal weakness  Endocrine:  Denies lymphadenopathy  Psych:  Denies confusion or change in mental status   Hem:  Denies active bleeding      Physical Exam: 57 y.o. male  Wt Readings from Last 3 Encounters:   06/21/22 91.7 kg (202 lb 3.2 oz)   01/24/22 88.5 kg (195 lb)   09/02/21 93.9 kg (207 lb)       There is no height or weight on file to calculate BMI.    There were no vitals filed for this  visit.  Vital signs reviewed.   General Appearance:    Alert, cooperative, in no acute distress                    Ortho exam    Exam is unchanged    Physical exam of the right elbow reveals no effusion no redness.  The patient has full range of motion.  They have tenderness over the lateral condyle.  There pain with resisted wrist extension no pain with passive wrist flexion.  They have a negative Tinel's.  Have no overlying skin changes no lymphedema no lymphadenopathy.  Good distal pulses         Assessment: Right lateral condyle-itis plan is to proceed with injection he understands we can do platelets as well in the future  Plan:   Follow up as indicated.  Ice, elevate, and rest as needed.  Discussed conservative measures of pain control including ice, bracing.  Also talked about the importance of strengthening  Angle Alba M.D.    Medium Joint Arthrocentesis: R elbow  Date/Time: 2/17/2023 8:06 AM  Consent given by: patient  Site marked: site marked  Timeout: Immediately prior to procedure a time out was called to verify the correct patient, procedure, equipment, support staff and site/side marked as required   Supporting Documentation  Indications: pain and diagnostic evaluation   Procedure Details  Location: elbow - R elbow (Lateral Epicondyle)  Preparation: Patient was prepped and draped in the usual sterile fashion  Needle size: 25 G  Approach: Into the area of the common flexor tendon.  Medications administered: 80 mg methylPREDNISolone acetate 80 MG/ML; 3 mL lidocaine (cardiac)  Patient tolerance: patient tolerated the procedure well with no immediate complications

## 2023-02-17 ENCOUNTER — CLINICAL SUPPORT (OUTPATIENT)
Dept: ORTHOPEDIC SURGERY | Facility: CLINIC | Age: 58
End: 2023-02-17
Payer: COMMERCIAL

## 2023-02-17 VITALS — HEIGHT: 72 IN | TEMPERATURE: 97.3 F | BODY MASS INDEX: 27.78 KG/M2 | WEIGHT: 205.1 LBS

## 2023-02-17 DIAGNOSIS — M77.11 LATERAL EPICONDYLITIS OF RIGHT ELBOW: Primary | ICD-10-CM

## 2023-02-17 PROCEDURE — 73070 X-RAY EXAM OF ELBOW: CPT | Performed by: ORTHOPAEDIC SURGERY

## 2023-02-17 PROCEDURE — 99213 OFFICE O/P EST LOW 20 MIN: CPT | Performed by: ORTHOPAEDIC SURGERY

## 2023-02-17 PROCEDURE — 20551 NJX 1 TENDON ORIGIN/INSJ: CPT | Performed by: ORTHOPAEDIC SURGERY

## 2023-02-17 RX ORDER — METHYLPREDNISOLONE ACETATE 80 MG/ML
80 INJECTION, SUSPENSION INTRA-ARTICULAR; INTRALESIONAL; INTRAMUSCULAR; SOFT TISSUE
Status: COMPLETED | OUTPATIENT
Start: 2023-02-17 | End: 2023-02-17

## 2023-02-17 RX ADMIN — METHYLPREDNISOLONE ACETATE 80 MG: 80 INJECTION, SUSPENSION INTRA-ARTICULAR; INTRALESIONAL; INTRAMUSCULAR; SOFT TISSUE at 08:06

## 2024-01-08 ENCOUNTER — TELEPHONE (OUTPATIENT)
Dept: ORTHOPEDIC SURGERY | Facility: CLINIC | Age: 59
End: 2024-01-08

## 2024-01-08 NOTE — TELEPHONE ENCOUNTER
Caller: Virgilio Sheets    Relationship to patient: Self    Best call back number: [phone] (home)       Chief complaint: RIGHT ELBOW    Type of visit: PRP INJECTION

## 2024-01-19 NOTE — PROGRESS NOTES
Patient: Virgilio Sheets  YOB: 1965  Date of Service: 1/19/2024    Chief Complaints: right elbow      Subjective:    History of Present Illness: Pt is seen in the office today with complaints of right elbow pain he has known lateral epicondylitis with an injury to that common extensor tendon we done periodic injections and PRP he is here for PRP he understands there is a surgical option        Allergies: No Known Allergies    Medications:   Home Medications:  Current Outpatient Medications on File Prior to Visit   Medication Sig    allopurinol (ZYLOPRIM) 300 MG tablet Take 1 tablet by mouth daily.    amLODIPine-benazepril (LOTREL) 10-40 MG per capsule     Aspirin Buf,CaCarb-MgCarb-MgO, 81 MG tablet Take 81 mg by mouth Daily.    Cholecalciferol (VITAMIN D) 2000 UNITS tablet Take 2,000 Units by mouth every other day.    clobetasol (TEMOVATE) 0.05 % ointment Apply  topically 2 (two) times a day.    Diclofenac Sodium 2 % solution Apply 1 Pump topically 2 (Two) Times a Day.    hydrochlorothiazide (HYDRODIURIL) 25 MG tablet Take 1 tablet by mouth daily.    levothyroxine (SYNTHROID, LEVOTHROID) 125 MCG tablet Take 1 tablet by mouth daily. (Patient taking differently: Take 175 mcg by mouth Daily.)    rosuvastatin (CRESTOR) 20 MG tablet     Synthroid 175 MCG tablet      No current facility-administered medications on file prior to visit.     Current Medications:  Scheduled Meds:  Continuous Infusions:No current facility-administered medications for this visit.    PRN Meds:.    I have reviewed the patient's medical history in detail and updated the computerized patient record.  Review and summarization of old records include:    Past Medical History:   Diagnosis Date    Bunion     Disease of thyroid gland     Gout     Hyperlipidemia     Hypertension         Past Surgical History:   Procedure Laterality Date    COLONOSCOPY  11/18/2015    FOOT SURGERY Right     TREATMENT    HERNIA REPAIR      TONSILLECTOMY           Social History     Occupational History    Not on file   Tobacco Use    Smoking status: Never    Smokeless tobacco: Never   Vaping Use    Vaping Use: Never used   Substance and Sexual Activity    Alcohol use: Yes     Comment: OCCASIONALLY    Drug use: No    Sexual activity: Yes     Partners: Female     Birth control/protection: None      Social History     Social History Narrative    Not on file        Family History   Problem Relation Age of Onset    Hypertension Mother     Hyperlipidemia Father     Hypertension Father     Prostate cancer Father     Heart attack Brother        ROS: 14 point review of systems was performed and was negative except for documented findings in HPI and today's encounter.     Allergies: No Known Allergies  Constitutional:  Denies fever, shaking or chills   Eyes:  Denies change in visual acuity   HENT:  Denies nasal congestion or sore throat   Respiratory:  Denies cough or shortness of breath   Cardiovascular:  Denies chest pain or severe LE edema   GI:  Denies abdominal pain, nausea, vomiting, bloody stools or diarrhea   Musculoskeletal:  Numbness, tingling, or loss of motor function only as noted above in history of present illness.  : Denies painful urination or hematuria  Integument:  Denies rash, lesion or ulceration   Neurologic:  Denies headache or focal weakness  Endocrine:  Denies lymphadenopathy  Psych:  Denies confusion or change in mental status   Hem:  Denies active bleeding      Physical Exam: 58 y.o. male  Wt Readings from Last 3 Encounters:   02/17/23 93 kg (205 lb 1.6 oz)   06/21/22 91.7 kg (202 lb 3.2 oz)   01/24/22 88.5 kg (195 lb)       There is no height or weight on file to calculate BMI.    There were no vitals filed for this visit.  Vital signs reviewed.   General Appearance:    Alert, cooperative, in no acute distress                    Ortho exam    Physical exam of the right elbow reveals no effusion no redness.  The patient has full range of motion.  They  have tenderness over the lateral condyle.  There pain with resisted wrist extension no pain with passive wrist flexion.  They have a negative Tinel's.  Have no overlying skin changes no lymphedema no lymphadenopathy.  Good distal pulses              Assessment: Right lateral condyle lightest plan is to proceed with PRP Kaitlin harvested the blood I injected it sterilely without difficulty    Plan:   Follow up as indicated.  Ice, elevate, and rest as needed.  Discussed conservative measures of pain control including ice, bracing.  Also talked about the importance of strengthening and stretching    Angle Alba M.D.

## 2024-01-23 ENCOUNTER — CLINICAL SUPPORT (OUTPATIENT)
Dept: ORTHOPEDIC SURGERY | Facility: CLINIC | Age: 59
End: 2024-01-23

## 2024-01-23 VITALS — TEMPERATURE: 98 F | HEIGHT: 72 IN | BODY MASS INDEX: 29.35 KG/M2 | WEIGHT: 216.7 LBS

## 2024-01-23 DIAGNOSIS — M77.11 LATERAL EPICONDYLITIS OF RIGHT ELBOW: Primary | ICD-10-CM

## 2024-06-11 NOTE — PROGRESS NOTES
New Shoulder      Patient: Virgilio Sheets        YOB: 1965    Medical Record Number: [id number]        Chief Complaints: Right shoulder pain      History of Present Illness: This is a 58-year-old right-hand-dominant male who presents complaining of right shoulder pain that he states really been going on for a year or so not 1 particular event or injury he used to just bother him in certain positions such as child's pose however he feels like its gotten worse it does bother him at other times he is an avid golfer as well as plays other sports.  Bothers him occasionally at night if he is laying on it      Allergies: No Known Allergies    Medications:   Home Medications:  Current Outpatient Medications on File Prior to Visit   Medication Sig    allopurinol (ZYLOPRIM) 300 MG tablet Take 1 tablet by mouth daily.    amLODIPine-benazepril (LOTREL) 10-40 MG per capsule     Aspirin Buf,CaCarb-MgCarb-MgO, 81 MG tablet Take 81 mg by mouth Daily.    Cholecalciferol (VITAMIN D) 2000 UNITS tablet Take 2,000 Units by mouth every other day.    hydrochlorothiazide (HYDRODIURIL) 25 MG tablet Take 1 tablet by mouth daily.    levothyroxine (SYNTHROID, LEVOTHROID) 125 MCG tablet Take 1 tablet by mouth daily. (Patient taking differently: Take 175 mcg by mouth Daily.)    rosuvastatin (CRESTOR) 20 MG tablet     clobetasol (TEMOVATE) 0.05 % ointment Apply  topically 2 (two) times a day.    Diclofenac Sodium 2 % solution Apply 1 Pump topically 2 (Two) Times a Day.    Synthroid 175 MCG tablet      No current facility-administered medications on file prior to visit.     Current Medications:  Scheduled Meds:  Continuous Infusions:No current facility-administered medications for this visit.    PRN Meds:.    Past Medical History:   Diagnosis Date    Ankle sprain     Bunion     Disease of thyroid gland     Frozen shoulder     Gout     Hyperlipidemia     Hypertension     Lumbosacral disc disease     Neck strain     Tennis elbow   "       Past Surgical History:   Procedure Laterality Date    COLONOSCOPY  11/18/2015    FOOT SURGERY Right     TREATMENT    HERNIA REPAIR      TONSILLECTOMY          Social History     Occupational History    Not on file   Tobacco Use    Smoking status: Never    Smokeless tobacco: Never   Vaping Use    Vaping status: Never Used   Substance and Sexual Activity    Alcohol use: Yes     Alcohol/week: 8.0 standard drinks of alcohol     Types: 2 Glasses of wine, 4 Cans of beer, 2 Shots of liquor per week     Comment: OCCASIONALLY    Drug use: No    Sexual activity: Yes     Partners: Female     Birth control/protection: None      Social History     Social History Narrative    Not on file        Family History   Problem Relation Age of Onset    Hypertension Mother     Hyperlipidemia Father     Hypertension Father     Prostate cancer Father     Cancer Father         prostate.    Heart attack Brother              Review of Systems:     Review of Systems      Physical Exam: 58 y.o. male  General Appearance:    Alert, cooperative, in no acute distress                   Vitals:    06/13/24 1341   Temp: 98.8 °F (37.1 °C)   Weight: 93.4 kg (205 lb 12.8 oz)   Height: 182.9 cm (72\")   PainSc:   7      Patient is alert and read ×3 no acute distress appears her above-listed at height weight and age.  Affect is normal respiratory rate is normal unlabored. Heart rate regular rate rhythm, sclera, dentition and hearing are normal for the purpose of this exam.    Ortho Exam  Physical exam of the right shoulder reveals no overlying skin changes no lymphedema no lymphadenopathy.  Patient has active flexion 180 with mild symptoms abduction is similar external rotation is to 50 and internal rotation to the upper lumbar spine with mild symptoms.  Patient has good rotator cuff strength 4+ over 5 with isometric strength testing with pain.  Patient has a positive impingement and a positive Lewis sign.  Patient has good cervical range of motion " which is full and asymptomatic no radicular symptoms.  Patient has a normal elbow exam.  Good distal pulses are present  Patient has pain with overhead activity and a positive Neer sign and a positive empty can sign , a positive drop arm and a definitive painful arc   Large Joint Arthrocentesis: R subacromial bursa  Date/Time: 6/13/2024 2:05 PM  Consent given by: patient  Site marked: site marked  Timeout: Immediately prior to procedure a time out was called to verify the correct patient, procedure, equipment, support staff and site/side marked as required   Supporting Documentation  Indications: pain   Procedure Details  Location: shoulder - R subacromial bursa  Preparation: Patient was prepped and draped in the usual sterile fashion  Needle gauge: 21G.  Approach: posterior  Medications administered: 80 mg methylPREDNISolone acetate 80 MG/ML; 2 mL lidocaine PF 1% 1 %  Patient tolerance: patient tolerated the procedure well with no immediate complications              Radiology:   AP, Scapular Y and Axillary Lateral of the right shoulder were ordered/reviewed to evauate shoulder pain.  I have no comparative films he has some acromioclavicular arthritis but this is mild otherwise very normal films  Imaging Results (Most Recent)       Procedure Component Value Units Date/Time    XR Shoulder 2+ View Right [[id number]] Resulted: 06/13/24 1335     Updated: 06/13/24 1335    Impression:      Ordering physician's impression is located in the Encounter Note dated 06/13/24. X-ray performed in the DR room.            Assessment/Plan: Right shoulder pain I think this is impingement which I discussed with him in detail he is seeing a  that is also physical therapist he did show me the exercise that she has been doing I think they are all very reasonable but I think we need to get this calm down with an injection he is agreeable to do that should he fail to get adequate relief with the injection physical therapy  then we could pursue an MRI  Cortisone Injection. See procedure note.  Cortisone Injection for DIAGNOSTIC and THERAPUTIC purposes.  Answers submitted by the patient for this visit:  Primary Reason for Visit (Submitted on 6/6/2024)  What is the primary reason for your visit?: Extremity Pain  Lower Extremity Injury Questionnaire (Submitted on 6/6/2024)  Chief Complaint: Extremity pain  Injury: No  Pain location: right shoulder  Pain quality: stabbing  Pain - numeric: 7/10  Progression since onset: worse  Additional information: Sharp pain with certain positions.

## 2024-06-13 ENCOUNTER — OFFICE VISIT (OUTPATIENT)
Dept: ORTHOPEDIC SURGERY | Facility: CLINIC | Age: 59
End: 2024-06-13
Payer: COMMERCIAL

## 2024-06-13 VITALS — WEIGHT: 205.8 LBS | BODY MASS INDEX: 27.87 KG/M2 | TEMPERATURE: 98.8 F | HEIGHT: 72 IN

## 2024-06-13 DIAGNOSIS — M25.511 RIGHT SHOULDER PAIN, UNSPECIFIED CHRONICITY: Primary | ICD-10-CM

## 2024-06-13 DIAGNOSIS — M75.41 IMPINGEMENT SYNDROME OF RIGHT SHOULDER: ICD-10-CM

## 2024-06-13 RX ORDER — METHYLPREDNISOLONE ACETATE 80 MG/ML
80 INJECTION, SUSPENSION INTRA-ARTICULAR; INTRALESIONAL; INTRAMUSCULAR; SOFT TISSUE
Status: COMPLETED | OUTPATIENT
Start: 2024-06-13 | End: 2024-06-13

## 2024-06-13 RX ORDER — LIDOCAINE HYDROCHLORIDE 10 MG/ML
2 INJECTION, SOLUTION EPIDURAL; INFILTRATION; INTRACAUDAL; PERINEURAL
Status: COMPLETED | OUTPATIENT
Start: 2024-06-13 | End: 2024-06-13

## 2024-06-13 RX ADMIN — LIDOCAINE HYDROCHLORIDE 2 ML: 10 INJECTION, SOLUTION EPIDURAL; INFILTRATION; INTRACAUDAL; PERINEURAL at 14:05

## 2024-06-13 RX ADMIN — METHYLPREDNISOLONE ACETATE 80 MG: 80 INJECTION, SUSPENSION INTRA-ARTICULAR; INTRALESIONAL; INTRAMUSCULAR; SOFT TISSUE at 14:05

## 2024-07-22 DIAGNOSIS — M75.101 TEAR OF RIGHT ROTATOR CUFF, UNSPECIFIED TEAR EXTENT, UNSPECIFIED WHETHER TRAUMATIC: Primary | ICD-10-CM

## 2024-07-22 DIAGNOSIS — S43.431D NONTRAUMATIC TYPE 1 SUPERIOR LABRAL ANTERIOR-TO-POSTERIOR (SLAP) TEAR OF RIGHT SHOULDER, SUBSEQUENT ENCOUNTER: ICD-10-CM
